# Patient Record
Sex: MALE | Race: WHITE | URBAN - METROPOLITAN AREA
[De-identification: names, ages, dates, MRNs, and addresses within clinical notes are randomized per-mention and may not be internally consistent; named-entity substitution may affect disease eponyms.]

---

## 2022-07-26 ENCOUNTER — OFFICE VISIT (OUTPATIENT)
Dept: URBAN - METROPOLITAN AREA CLINIC 24 | Facility: CLINIC | Age: 77
End: 2022-07-26
Payer: COMMERCIAL

## 2022-07-26 DIAGNOSIS — H18.603 KERATOCONUS, UNSPECIFIED, BILATERAL: Primary | ICD-10-CM

## 2022-07-26 DIAGNOSIS — H26.493 OTHER SECONDARY CATARACT, BILATERAL: ICD-10-CM

## 2022-07-26 DIAGNOSIS — H35.3131 BILATERAL NONEXUDATIVE AGE-RELATED MACULAR DEGENERATION, EARLY DRY STAGE: ICD-10-CM

## 2022-07-26 PROCEDURE — 92025 CPTRIZED CORNEAL TOPOGRAPHY: CPT | Performed by: OPTOMETRIST

## 2022-07-26 PROCEDURE — 92134 CPTRZ OPH DX IMG PST SGM RTA: CPT | Performed by: OPTOMETRIST

## 2022-07-26 PROCEDURE — 99204 OFFICE O/P NEW MOD 45 MIN: CPT | Performed by: OPTOMETRIST

## 2022-07-26 ASSESSMENT — INTRAOCULAR PRESSURE
OD: 14
OS: 14

## 2022-07-26 ASSESSMENT — VISUAL ACUITY
OD: 20/40
OS: 20/80

## 2022-07-26 NOTE — IMPRESSION/PLAN
Impression: Bilateral nonexudative age-related macular degeneration, early dry stage: H35.3131. Plan: Dry Age Related Macular Degeneration- Patient educated regarding finding. Recommend patient take an formula multiple vitamin daily. Observation is all that is indicated at this time. 
-- currently taking Ocuvite 50+; continue

## 2022-07-26 NOTE — IMPRESSION/PLAN
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 Plan: Future Yag OD may be indicated. Pt advised. Monitor for now.

## 2022-07-26 NOTE — IMPRESSION/PLAN
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
-- longstanding dx; ~40 year history of RGP wear. Plan: Pt reports longtime care RIO Morocho, OD. Pt unable to tolerate recent RGPS. Pt researched / requests consult w/ Dr. Gisel Simeon. Will arrange per discussion.

## 2022-10-05 ENCOUNTER — OFFICE VISIT (OUTPATIENT)
Dept: URBAN - METROPOLITAN AREA CLINIC 10 | Facility: CLINIC | Age: 77
End: 2022-10-05
Payer: COMMERCIAL

## 2022-10-05 DIAGNOSIS — H26.493 OTHER SECONDARY CATARACT, BILATERAL: ICD-10-CM

## 2022-10-05 DIAGNOSIS — H35.3131 BILATERAL NONEXUDATIVE AGE-RELATED MACULAR DEGENERATION, EARLY DRY STAGE: ICD-10-CM

## 2022-10-05 DIAGNOSIS — H18.603 KERATOCONUS, UNSPECIFIED, BILATERAL: Primary | ICD-10-CM

## 2022-10-05 PROCEDURE — 76514 ECHO EXAM OF EYE THICKNESS: CPT | Performed by: OPHTHALMOLOGY

## 2022-10-05 PROCEDURE — 99204 OFFICE O/P NEW MOD 45 MIN: CPT | Performed by: OPHTHALMOLOGY

## 2022-10-05 PROCEDURE — 92025 CPTRIZED CORNEAL TOPOGRAPHY: CPT | Performed by: OPHTHALMOLOGY

## 2022-10-05 ASSESSMENT — INTRAOCULAR PRESSURE
OS: 12
OD: 14

## 2022-10-05 ASSESSMENT — KERATOMETRY
OD: 45.75
OS: 47.13

## 2022-10-05 NOTE — IMPRESSION/PLAN
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
-- longstanding dx; ~40 year history of RGP wear. Plan: Pt reports longtime care RIO Morocho, OD. Pt unable to tolerate recent RGPS. Pt is here to explore options for vision correction other than glasses or CTLs. Explained to pt that PKP would be too aggressive at this time, not a candidate for LASIK/PRK, few surgeons offer INTACs anymore (and I do not offer these). Explained to patient that there is a decreased risk of progression given his age, however will monitor for now, and if progression is noted, will proceed with crosslinking at that time. Pt denies any progression in years. May be monitored yearly topos in general clinic, if progression is noted may refer back to cornea clinic for crosslinking. I recommend trial of scleral contact lenses, will refer to optom.   If unable to tolerate, may send back to cornea clinic for consideration of PKP